# Patient Record
Sex: FEMALE | Race: WHITE | NOT HISPANIC OR LATINO | URBAN - METROPOLITAN AREA
[De-identification: names, ages, dates, MRNs, and addresses within clinical notes are randomized per-mention and may not be internally consistent; named-entity substitution may affect disease eponyms.]

---
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
## 2024-07-23 NOTE — ED PROVIDER NOTE - WR INTERPRETED BY 1
well developed, well nourished , in no acute distress , ambulating without difficulty , normal communication ability
Monique Rausch

## 2025-05-31 ENCOUNTER — EMERGENCY (EMERGENCY)
Facility: HOSPITAL | Age: 20
LOS: 1 days | End: 2025-05-31
Attending: EMERGENCY MEDICINE | Admitting: EMERGENCY MEDICINE
Payer: COMMERCIAL

## 2025-05-31 VITALS
HEART RATE: 83 BPM | OXYGEN SATURATION: 100 % | RESPIRATION RATE: 16 BRPM | SYSTOLIC BLOOD PRESSURE: 123 MMHG | TEMPERATURE: 98 F | DIASTOLIC BLOOD PRESSURE: 84 MMHG | HEIGHT: 60 IN | WEIGHT: 115.08 LBS

## 2025-05-31 PROCEDURE — 73140 X-RAY EXAM OF FINGER(S): CPT | Mod: 26,LT

## 2025-05-31 PROCEDURE — 99284 EMERGENCY DEPT VISIT MOD MDM: CPT

## 2025-05-31 PROCEDURE — 99283 EMERGENCY DEPT VISIT LOW MDM: CPT

## 2025-05-31 PROCEDURE — 73140 X-RAY EXAM OF FINGER(S): CPT

## 2025-05-31 RX ORDER — ACETAMINOPHEN 500 MG/5ML
975 LIQUID (ML) ORAL ONCE
Refills: 0 | Status: COMPLETED | OUTPATIENT
Start: 2025-05-31 | End: 2025-05-31

## 2025-05-31 NOTE — ED PROVIDER NOTE - CARE PROVIDER_API CALL
Ramiro Jones  Plastic Surgery  [address] [place] [address], [address]  [place] [address]  Phone: [phone]  Fax: [phone]  Follow Up Time: 4-6 Days

## 2025-05-31 NOTE — ED ADULT NURSE NOTE - CAS TRG GENERAL AIRWAY, MLM
RDW 13.4 11/19/2024 03:08 PM    NRBC 0.00 11/19/2024 03:08 PM    LYMPHOPCT 20 11/19/2024 03:08 PM    MONOPCT 8 11/19/2024 03:08 PM    EOSPCT 1 11/19/2024 03:08 PM    BASOPCT 0 11/19/2024 03:08 PM    MONOSABS 0.7 11/19/2024 03:08 PM    LYMPHSABS 1.7 11/19/2024 03:08 PM    EOSABS 0.1 11/19/2024 03:08 PM    BASOSABS 0.0 11/19/2024 03:08 PM    DIFFTYPE AUTOMATED 11/19/2024 03:08 PM     BMP:    Lab Results   Component Value Date/Time     11/19/2024 03:08 PM    K 3.6 11/19/2024 03:08 PM     11/19/2024 03:08 PM    CO2 25 11/19/2024 03:08 PM    BUN 11 11/19/2024 03:08 PM    CREATININE 0.87 11/19/2024 03:08 PM    CALCIUM 9.5 11/19/2024 03:08 PM    GFRAA 78 08/23/2019 04:36 PM    LABGLOM 81 11/19/2024 03:08 PM    LABGLOM 77 04/16/2024 07:31 AM    GLUCOSE 86 11/19/2024 03:08 PM        IMAGING: Independently reviewed on PACS.    US Pelvis Complete Non Ob Transabdominal and transvaginal on 1/24/24:    FINDINGS: The uterus measures 17.3 x 11.5 cm and demonstrates multiple uterine  fibroids are noted. Largest in the left measures 6.5 x 5.7 x 7.1 cm. There is a  exophytic fibroid that measures 8.4 x 5.3 x 7.2 cm. Largest right fibroid  measures 5.5 x 6.1 x 4.8 cm.. Endometrial stripe measures     Bilateral ovaries are not visualized. There is no free fluid.        IMPRESSION:  Enlarged uterus containing multiple uterine fibroids.       Vitals:    02/19/25 0922   BP: 138/81   Pulse: 76   Weight: 131.5 kg (290 lb)   Height: 1.829 m (6')        Physical Exam:   CONSTITUTIONAL: Well-appearing and in no apparent distress   HEART: regular rate and rhythm, S1, S2 normal, no murmur, click, rub or gallop  LUNG:  clear to auscultation bilaterally  ABDOMEN: soft, non-tender; bowel sounds normal; no masses,  no organomegaly  EXTREMITIES: No pedal edema    Assessment:   1. Uterine leiomyoma, unspecified location  2. Menorrhagia with irregular cycle  3. History of iron deficiency anemia     Pleasant 51-year-old female with 
Patent

## 2025-05-31 NOTE — ED PROVIDER NOTE - PHYSICAL EXAMINATION
exam:   General: well appearing, NAD.   HEENT: , normal phonation  cor: RRR, s1s2, 2+rad pulses.   lungs: ctabl, no resp distress.   neuro: alert, no facial asymmetry, WEINSTEIN, nonfocal  MSK: no extremity swelling. Left 3rd and 4th fingertips with some erythema and slight swelling.  Mild tenderness diffusely of the left third digit.  Mild tenderness of the distal phalanx of the fourth digit.  Flexes and extends all joints of the 3rd and 4th digit.  Rest of hand/fingers nontender.

## 2025-05-31 NOTE — ED ADULT NURSE NOTE - OBJECTIVE STATEMENT
Patient received A&Ox4, ambulatory with steady gait at the present. Patient complains of L hand/finger pain after getting her hand stuck between a boat & the ropes. Redness & bruising noted to tips of 3 middle fingers. Patient is able to move fingers but it is painful. No other symptoms associated. Side rails up, call light in reach, safety maintained, comfort measures provided and MD evaluation in progress.

## 2025-05-31 NOTE — ED PROVIDER NOTE - CLINICAL SUMMARY MEDICAL DECISION MAKING FREE TEXT BOX
19-year-old female with no significant past medical history complaining of left finger pain after crush type injury sustained at 2345 tonight.  Patient was holding a rope to secure the boat when movement of the boat pulled the rope against a surface and pinched her fingers.  Patient complains of some pain and swelling over fingers in particular at the distal 3rd and 4th digits.  No other injury.  No numbness tingling.    Patient has some erythema of the 3rd and 4th fingertips, tenderness along the entire third digit and the distal phalanx of the fourth digit.  She is able to flex and extend all finger joints.  Partial DDx: Finger crush injury, contusion, finger fracture.  Will check x-ray.  Motrin for pain    Update: X-rays unremarkable, negative for fracture.  Follow-up hand surgery as needed

## 2025-05-31 NOTE — ED PROVIDER NOTE - OBJECTIVE STATEMENT
19-year-old female with no significant past medical history complaining of left finger pain after crush type injury sustained at 2345 tonight.  Patient was holding a rope to secure the boat when movement of the boat pulled the rope against a surface and pinched her fingers.  Patient complains of some pain and swelling over fingers in particular at the distal 3rd and 4th digits.  No other injury.  No numbness tingling.

## 2025-05-31 NOTE — ED PROVIDER NOTE - PATIENT PORTAL LINK FT
You can access the FollowMyHealth Patient Portal offered by [facility] by registering at the following website: [email or web address]. By joining [employer]’s FollowMyHealth portal, you will also be able to view your health information using other applications (apps) compatible with our system.

## 2025-05-31 NOTE — ED PROVIDER NOTE - NSFOLLOWUPINSTRUCTIONS_ED_ALL_ED_FT
-Take Motrin (ibuprofen) 400 to 600 mg every 6 hours as needed for pain.  -In addition to Motrin, you can also take Tylenol 1000 mg every 6 hours as needed for pain.        Follow-up with hand surgeon Dr. Jones as needed    Contusion in Adults    WHAT YOU NEED TO KNOW:    A contusion is a bruise that appears on your skin after an injury. A bruise happens when small blood vessels tear but skin does not. Blood leaks into nearby tissue, such as soft tissue or muscle.    DISCHARGE INSTRUCTIONS:    Return to the emergency department if:   •You have new trouble moving the injured area.      •You have tingling or numbness in or near the injured area.      •Your hand or foot below the bruise gets cold or turns pale.       Call your doctor if:   •You find a new lump in the injured area.      •Your symptoms do not improve with treatment after 4 to 5 days.      •You have questions or concerns about your condition or care.      Medicines: You may need any of the following:   •NSAIDs help decrease swelling and pain or fever. This medicine is available with or without a doctor's order. NSAIDs can cause stomach bleeding or kidney problems in certain people. If you take blood thinner medicine, always ask your healthcare provider if NSAIDs are safe for you. Always read the medicine label and follow directions.      •Prescription pain medicine may be given. Ask your healthcare provider how to take this medicine safely. Some prescription pain medicines contain acetaminophen. Do not take other medicines that contain acetaminophen without talking to your healthcare provider. Too much acetaminophen may cause liver damage. Prescription pain medicine may cause constipation. Ask your healthcare provider how to prevent or treat constipation.       •Take your medicine as directed. Contact your healthcare provider if you think your medicine is not helping or if you have side effects. Tell him of her if you are allergic to any medicine. Keep a list of the medicines, vitamins, and herbs you take. Include the amounts, and when and why you take them. Bring the list or the pill bottles to follow-up visits. Carry your medicine list with you in case of an emergency.      Help a contusion heal:   •Rest the injured area or use it less than usual. If you bruised your leg or foot, you may need crutches or a cane to help you walk. This will help you keep weight off your injured body part.       •Apply ice to decrease swelling and pain. Ice may also help prevent tissue damage. Use an ice pack, or put crushed ice in a plastic bag. Cover it with a towel and place it on your bruise for 15 to 20 minutes every hour or as directed.      •Use compression to support the area and decrease swelling. Wrap an elastic bandage around the area over the bruised muscle. Make sure the bandage is not too tight. You should be able to fit 1 finger between the bandage and your skin.      •Elevate (raise) your injured body part above the level of your heart to help decrease pain and swelling. Use pillows, blankets, or rolled towels to elevate the area as often as you can.      •Do not drink alcohol as directed. Alcohol may slow healing.      •Do not stretch injured muscles right after your injury. Ask your healthcare provider when and how you may safely stretch after your injury. Gentle stretches can help increase your flexibility.      •Do not massage the area or put heating pads on the bruise right after your injury. Heat and massage may slow healing. Your healthcare provider may tell you to apply heat after several days. At that time, heat will start to help the injury heal.      Prevent another contusion:   •Stretch and warm up before you play sports or exercise.      •Wear protective gear when you play sports. Examples are shin guards and padding.       •If you begin a new physical activity, start slowly to give your body a chance to adjust.      Follow up with your doctor as directed: Write down your questions so you remember to ask them during your visits.